# Patient Record
Sex: MALE | Race: WHITE | NOT HISPANIC OR LATINO
[De-identification: names, ages, dates, MRNs, and addresses within clinical notes are randomized per-mention and may not be internally consistent; named-entity substitution may affect disease eponyms.]

---

## 2019-07-11 ENCOUNTER — TRANSCRIPTION ENCOUNTER (OUTPATIENT)
Age: 39
End: 2019-07-11

## 2020-05-15 ENCOUNTER — TRANSCRIPTION ENCOUNTER (OUTPATIENT)
Age: 40
End: 2020-05-15

## 2023-01-30 ENCOUNTER — APPOINTMENT (OUTPATIENT)
Dept: ORTHOPEDIC SURGERY | Facility: CLINIC | Age: 43
End: 2023-01-30
Payer: OTHER MISCELLANEOUS

## 2023-01-30 VITALS — HEIGHT: 67 IN | BODY MASS INDEX: 25.11 KG/M2 | WEIGHT: 160 LBS

## 2023-01-30 DIAGNOSIS — Z78.9 OTHER SPECIFIED HEALTH STATUS: ICD-10-CM

## 2023-01-30 DIAGNOSIS — Z82.49 FAMILY HISTORY OF ISCHEMIC HEART DISEASE AND OTHER DISEASES OF THE CIRCULATORY SYSTEM: ICD-10-CM

## 2023-01-30 DIAGNOSIS — R22.32 LOCALIZED SWELLING, MASS AND LUMP, LEFT UPPER LIMB: ICD-10-CM

## 2023-01-30 PROCEDURE — 99072 ADDL SUPL MATRL&STAF TM PHE: CPT

## 2023-01-30 PROCEDURE — 99204 OFFICE O/P NEW MOD 45 MIN: CPT

## 2023-01-30 PROCEDURE — 73140 X-RAY EXAM OF FINGER(S): CPT | Mod: LT

## 2023-01-30 NOTE — HISTORY OF PRESENT ILLNESS
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 Pt had previous treatment at a local urgent care center and wound was treated with ethibond.\par Pt was informed that he had no fracture. Since that time pt has noted a small "bump" to the ulnar side of the distal phalanx that is mildly painful. \par \par PMH: Crohn's Dz. \par Allergies: Sulfa (hives) , ASA (GI bleed). [] : Post Surgical Visit: no [FreeTextEntry1] : left thumb  [FreeTextEntry3] : 7/31/22 [de-identified] : urgent care  [de-identified] : none  [de-identified] :

## 2023-01-30 NOTE — ASSESSMENT
[FreeTextEntry1] : \par Pt instructed on daily desensitization exercises.\par RTO in 4 weeks for f/u care\par Will  consider MRI of the left thumb if no improvement noted.

## 2023-01-30 NOTE — WORK
[Crush Injury] : crush injury [Was the competent medical cause of the injury] : was the competent medical cause of the injury [Are consistent with the injury] : are consistent with the injury [Consistent with my objective findings] : consistent with my objective findings [Mild Partial] : mild partial [Does not reveal pre-existing condition(s) that may affect treatment/prognosis] : does not reveal pre-existing condition(s) that may affect treatment/prognosis [Can return to work without limitations on ______] : can return to work without limitations on [unfilled] [N/A] : : Not Applicable [Patient] : patient [No Rx restrictions] : No Rx restrictions. [I provided the services listed above] :  I provided the services listed above. [FreeTextEntry1] : good

## 2023-01-30 NOTE — IMAGING
[Left] : left fingers [de-identified] : Left thumb with ulna sided healed laceration.\par There is a small mass palpated to this region with mild ttp noted.\par IP and MCP joints with no ligamentous laxity.\par ROM is full with no significant discomfort.\par All digits are nvi with intact flexor and extensor tendon function.\par  [FreeTextEntry9] : left thumb xray is w/o pathologic findings.

## 2023-02-27 ENCOUNTER — APPOINTMENT (OUTPATIENT)
Dept: ORTHOPEDIC SURGERY | Facility: CLINIC | Age: 43
End: 2023-02-27